# Patient Record
Sex: MALE | Race: OTHER
[De-identification: names, ages, dates, MRNs, and addresses within clinical notes are randomized per-mention and may not be internally consistent; named-entity substitution may affect disease eponyms.]

---

## 2019-04-24 ENCOUNTER — HOSPITAL ENCOUNTER (EMERGENCY)
Dept: HOSPITAL 62 - ER | Age: 34
Discharge: HOME | End: 2019-04-24
Payer: COMMERCIAL

## 2019-04-24 VITALS — DIASTOLIC BLOOD PRESSURE: 70 MMHG | SYSTOLIC BLOOD PRESSURE: 118 MMHG

## 2019-04-24 DIAGNOSIS — F17.200: ICD-10-CM

## 2019-04-24 DIAGNOSIS — M54.5: Primary | ICD-10-CM

## 2019-04-24 PROCEDURE — 99283 EMERGENCY DEPT VISIT LOW MDM: CPT

## 2019-04-24 PROCEDURE — 96372 THER/PROPH/DIAG INJ SC/IM: CPT

## 2019-04-24 PROCEDURE — 72110 X-RAY EXAM L-2 SPINE 4/>VWS: CPT

## 2019-04-24 NOTE — ER DOCUMENT REPORT
HPI





- HPI


Time Seen by Provider: 04/24/19 08:40


Pain Level: 5


Notes: 





33-year-old male presents to the ED for complaints of lower back pain the last 2

days, states pain was gradual, cannot remember significant episode that 

perpetuated his back pain, patient does work for the post service and states 

that his pain does become progressively worse when he works.  Patient reports he

did have herniated disc L4-L5 when he was in [facility] in 2015, he 

did have an MRI at that time, did follow-up with chiropractor, was never seen by

orthopedic specialist.  Denies fevers, chills,  chest pain,palpitations,  

shortness of breath, dyspnea, nausea, vomiting, diarrhea, abdominal pain, 

hematuria,blurred vision, double vision, loss of vision, speech changes, LH, 

dizziness, syncope, headaches, neck pain, weakness, bowel or bladder 

dysfunction, saddle anesthesia, numbness or tingling in bilateral upper or lower

extremities equally, muscle paralysis, weakness in bilateral upper or lower 

extremities equally or rash.





Past Medical History





- General


Information source: Patient





- Social History


Smoking Status: Current Every Day Smoker


Family History: Reviewed & Not Pertinent


Patient has suicidal ideation: No


Patient has homicidal ideation: No





- Past Medical History


Cardiac Medical History: 


   Denies: Hx Coronary Artery Disease, Hx Heart Attack, Hx Hypertension


Pulmonary Medical History: 


   Denies: Hx Asthma, Hx Bronchitis, Hx COPD, Hx Pneumonia


Neurological Medical History: Denies: Hx Cerebrovascular Accident, Hx Seizures


Renal/ Medical History: Denies: Hx Peritoneal Dialysis


Musculoskeletal Medical History: Denies Hx Arthritis


Past Surgical History: Reports: Hx Orthopedic Surgery





- Immunizations


Hx Diphtheria, Pertussis, Tetanus Vaccination: No





Vertical Provider Document





- CONSTITUTIONAL


Agree With Documented VS: Yes


Notes: 





REVIEW OF SYSTEMS:


CONSTITUTIONAL :  Denies fever,  chills, or sweats.  Denies recent illness.


EENT:   Denies eye, ear, throat, or mouth pain or symptoms.  Denies nasal or 

sinus congestion or discharge.  Denies throat, tongue, or mouth swelling or 

difficulty swallowing.


CARDIOVASCULAR:  Denies chest pain.  Denies palpitations or racing or irregular 

heart beat.  Denies ankle edema.


RESPIRATORY:  Denies cough, cold, or chest congestion.  Denies shortness of 

breath, difficulty breathing, or wheezing.


GASTROINTESTINAL:  Denies abdominal pain or distention.  Denies nausea, 

vomiting, or diarrhea.  Denies blood in vomitus, stools, or per rectum.  Denies 

black, tarry stools.  Denies constipation.  


GENITOURINARY:  Denies difficulty urinating, painful urination, burning, 

frequency, blood in urine, or discharge.


MUSCULOSKELETAL:  Denies back or neck pain or stiffness.  Denies joint pain or 

swelling.


SKIN:   Denies rash, lesions or sores.


HEMATOLOGIC :   Denies easy bruising or bleeding.


LYMPHATIC:  Denies swollen, enlarged glands.


NEUROLOGICAL:  Denies confusion or altered mental status.  Denies passing out or

loss of consciousness.  Denies dizziness or lightheadedness.  Denies headache.  

Denies weakness or paralysis or loss of use of either side.  Denies problems 

with gait or speech.  Denies sensory loss, numbness, or tingling.  Denies seiz

ures.


PSYCHIATRIC:  Denies anxiety or stress.  Denies depression, suicidal ideation, 

or homicidal ideation.





ALL OTHER SYSTEMS REVIEWED AND NEGATIVE.





Dictation was performed using Dragon voice recognition software 





PHYSICAL EXAMINATION:





GENERAL: Well-appearing, well-nourished and in no acute distress.





HEAD: Atraumatic, normocephalic.





EYES: Pupils equal round and reactive to light, extraocular movements intact, 

sclera anicteric, conjunctiva are normal.





ENT: Nares patent, oropharynx clear without exudates.  Moist mucous membranes.





NECK: Normal range of motion, supple without lymphadenopathy





LUNGS: Breath sounds clear to auscultation bilaterally and equal.  No wheezes 

rales or rhonchi.





HEART: Regular rate and rhythm without murmurs





ABDOMEN: Soft, nontender, nondistended abdomen.  No guarding, no rebound.  No 

masses appreciated.





Musculoskeletal: Normal range of motion, no pitting or edema.  No cyanosis Pain 

with flexion and extension at 30 degrees, positive straight leg test*** Normal 

hip rotation. DTR +2 in BLE equally.  Strength 5 out of 5 both distally and 

proximally to bilateral lower extremities normal motor and sensory function in 

BLE equally. Distal pulses + 2 BLE equally. Noted paraspinal tenderness near L2 

and L$. Noted spinal tenderness L2-L4. No CVA tenderness bilaterally. Femoral 

pulses + 2 bilaterally and equally. No abrasions, scars, lacerations, ecchymosis

of any recent trauma. normal gait. 











NEUROLOGICAL: Cranial nerves grossly intact.  Normal speech, normal gait.  

Normal sensory, motor exams 





PSYCH: Normal mood, normal affect.





SKIN: Warm, Dry, normal turgor, no rashes or lesions noted.





- INFECTION CONTROL


TRAVEL OUTSIDE OF THE [place] IN LAST 30 DAYS: No





Course





- Re-evaluation


Re-evalutation: 





04/24/19 10:33


33-year-old male presents to the ED with complaints of having back pain today, 

states he just noticed it, no trauma.  For L5 herniated disc back in 2015, had 

seen chiropractor which did help, this was evaluated at [facility] and 

[facility].  Patient works for post [employer] and states that sometimes 

his back does act of after carrying heavy packages.  X-ray lumbar spine shows 

degenerative joint disease, no acute fracture dislocation, discussed with 

patient that he does need to follow-up with orthopedic specialist outpatient and

primary care provider, patient was given Toradol 60 mg IM with some relief.  

Vitals have remained stable afebrile in no distress.  Patient has full strength 

in bilateral lower extremities equally, full motor and sensory function of BLE. 

We will send patient home on muscle relaxers, to take Norco when having severe 

pain, do not drive, drink or operate heavy machinery while taking medication as 

this can cause cognitive impairment or sedation.  Advised to follow-up with 

orthopedic specialist 24 to 48 hours.  After performing a Medical Screening 

Examination, I estimate there is LOW risk for EXPANDING OR RUPTURED ABDOMINAL 

AORTIC ANEURYSM, CAUDA EQUINA SYNDROME, EPIDURAL MASS ABSCESS OR LESION(S), 

OSTEOMYELITIS,PERSONAL HISTORY OF CANCER, IMMUNOSUPPERSSSION, HISTORY OF IV DRUG

USE, FRACTURE, CORD COMPERSSION, CANCER, RETROPERITONEAL BLEED, SPINAL EPIDURAL 

HEMATOMA, or HERNIATED DISK CAUSING SEVERE SPINAL STENOSIS, thus I consider the 

discharge disposition reasonable.  I have reevaluated this patient multiple 

times and no significant life threatening changes are noted. The patient  and I 

have discussed the diagnosis and risks, and we agree with discharging home and 

close follow-up. We also discussed returning to the Emergency Department 

immediately if new or worsening symptoms occur with the understanding that 

symptoms and presentations can change. We have discussed the symptoms which are 

most concerning (e.g., saddle anesthesia, urinary or bowel incontinence or 

retention, changing or worsening pain) that necessitate immediate return.











- Vital Signs


Vital signs: 


                                        











Temp Pulse Resp BP Pulse Ox


 


 97.8 F   84   18   129/75 H  99 


 


 04/24/19 07:56  04/24/19 07:56  04/24/19 07:56  04/24/19 07:56  04/24/19 07:56














Discharge





- Discharge


Clinical Impression: 


 Lumbar back pain





Condition: Stable


Disposition: HOME, SELF-CARE


Instructions:  Low Back Pain ([facility]), Muscle Strain ([facility]), Oral Narcotic 

Medication ([facility]), Pain Medication Injection ([facility]), Warm Packs ([facility])


Additional Instructions: 


Muscle Relaxers





     Muscle relaxing medications are usually prescribed for acute muscle spasm 

or injury to the neck and back.  They are often combined with antiinflammatory 

pain medication for increased relief.


     You may stop the muscle relaxer when the pain and stiffness have improved. 

Start the medication again if spasms recur.  


     Muscle relaxers may cause drowsiness, especially with the first dose.  Do 

not operate machinery or drive while under the effects of the medication.  Most 

muscle relaxers last up to 24 hours.  Do not combine the medication with 

alcohol.





Muscle Strain





     You have strained a muscle -- torn the fibers within the muscle. This often

occurs with strenuous exertion, or during an injury that suddenly stretches the 

muscle.  The seriousness of a strain varies. Some strains heal within days, 

others cause problems for months.


     X-rays cannot show a muscle strain.  X-rays are taken only if symptoms 

suggest that a fracture could be present.


     The usual treatment of a muscle strain is rest and ice packs. Sometimes, a 

sling, splint, or crutches may be necessary to rest the muscle.  The muscle can 

be used again once pain subsides.  Severe strains require a special exercise and

stretching program to prevent permanent stiffness and disability.  Your doctor 

will advise you if this will be necessary.


     Call the doctor immediately if pain or swelling becomes severe, or if 

numbness or discoloration develop.





Toradol Injection





     You have been given an injection of ketorolac tromethamine (Toradol).  This

is an excellent, safe drug for pain control.  It also has potent 

antiinflammatory action.  You should have significant pain relief within about 

one hour.


     Toradol is not addicting and is non-sedating.  It does not interfere with 

driving or work.


     Call or return if you develop itching, hives, shortness of breath, or rash.








Back Injury with Fracture





     You have a fracture of a vertebra in your back. The fracture does not 

appear to require an operation, and there's no evidence of danger to your spinal

cord. You should be able to recover at home. The transverse process sticks out 

to the side of the vertebra.  Muscles attach to it, allowing you to tip your 

back from side to side.  The fracture is usually caused by a blow to the back.


     Although painful, the fracture is not serious. The risk of complications is

very low. You can expect to recover fully within a few weeks.


     The treatment of this fracture is essentially the same as for a severe back

strain.  Muscle relaxers or antiinflammatory medication may be prescribed.  You 

should rest in bed for a few days until the pain eases. Any position that is 

comfortable is acceptable. Sometimes it helps to put a pillow under your knees 

if you're lying flat, or between the knees if you're lying on your side.


     Ice pack the painful area at first. After you are active again, you can 

apply gentle heat intermittently to relax sore muscles. You can continue with 

ice packs if you find them helpful in reducing muscle pain.


     As you improve, begin light activity. A recheck will determine when you are

ready to resume work or sports. Call the doctor or return at once if you develop

radiating pains, muscle weakness, blood in the urine, problems with the bladder 

or bowels, or numbness.





Take muscle relaxers and anti-inflammatories as directed, take oral narcotic as 

needed for severe pain, do not drive, drink or operate heavy machinery while 

taking medication as it can cause sedation and impairment of cognitive function.

 Follow-up with orthopedic specialist within 1 week, follow-up with primary care

provider within the next 24 to 48 hours, apply heat 20 minutes on 20 minutes off

several times a day, advised to do back stretches, you may need further imaging 

with your primary orthopedic specialist.  Return immediately for any new or 

worsening symptoms.





Follow up with primary care provider, call tomorrow to make followup 

appointment.


Prescriptions: 


RX: Meloxicam [Mobic] 15 mg PO DAILY #15 tablet


Methocarbamol [Robaxin 500 mg Tablet] 500 mg PO QID PRN #30 tablet


 PRN Reason: 


Forms:  Return to Work


Referrals: 


OWEN MERINO MD [ACTIVE STAFF] - Follow up as needed


FEDERICO JEFFERS MD [ACTIVE STAFF] - Follow up in 3-5 days

## 2019-04-24 NOTE — RADIOLOGY REPORT (SQ)
EXAM DESCRIPTION:  L SPINE WHOLE



COMPLETED DATE/TIME:  4/24/2019 9:51 am



REASON FOR STUDY:  lumbar pain, spinal tenderness, no trauma



COMPARISON:  None.



NUMBER OF VIEWS:  Five views including obliques.



TECHNIQUE:  AP, lateral, oblique, and sacral radiographic images acquired of the lumbar spine.



LIMITATIONS:  None.



FINDINGS:  MINERALIZATION: Normal.

SEGMENTATION: Normal.  No transitional anatomy.

ALIGNMENT: Normal.

VERTEBRAE: Maintained height.  No fracture or worrisome bone lesion.

DISCS: Mild disc space narrowing L5-S1.

POSTERIOR ELEMENTS: Bilateral pars defects L5-S1.

HARDWARE: None in the spine.

PARASPINAL SOFT TISSUES: Normal.

PELVIS: Intact as visualized. No fractures or worrisome bone lesions. SI joints intact.

OTHER: No other significant finding.



IMPRESSION:  Bilateral pars defects L5-S1.  Study otherwise unremarkable.  Mild disc space narrowing 
L5-S1.



TECHNICAL DOCUMENTATION:  JOB ID:  [id number]

 2011 [employer]- All Rights Reserved



Reading location - IP/workstation name: [email or web address]

## 2020-11-05 ENCOUNTER — HOSPITAL ENCOUNTER (EMERGENCY)
Dept: HOSPITAL 62 - ER | Age: 35
Discharge: LEFT BEFORE DISCHARGE | End: 2020-11-05
Payer: COMMERCIAL

## 2020-11-05 VITALS — SYSTOLIC BLOOD PRESSURE: 149 MMHG | DIASTOLIC BLOOD PRESSURE: 88 MMHG

## 2020-11-05 DIAGNOSIS — R06.02: ICD-10-CM

## 2020-11-05 DIAGNOSIS — R07.9: ICD-10-CM

## 2020-11-05 DIAGNOSIS — R10.10: Primary | ICD-10-CM

## 2020-11-05 DIAGNOSIS — R11.2: ICD-10-CM

## 2020-11-05 LAB
ADD MANUAL DIFF: NO
ALBUMIN SERPL-MCNC: 3.4 G/DL (ref 3.5–5)
ALP SERPL-CCNC: 43 U/L (ref 38–126)
ANION GAP SERPL CALC-SCNC: 8 MMOL/L (ref 5–19)
APPEARANCE UR: CLEAR
APTT PPP: YELLOW S
AST SERPL-CCNC: 19 U/L (ref 17–59)
BASOPHILS # BLD AUTO: 0.1 10^3/UL (ref 0–0.2)
BASOPHILS NFR BLD AUTO: 0.5 % (ref 0–2)
BILIRUB DIRECT SERPL-MCNC: 0 MG/DL (ref 0–0.4)
BILIRUB SERPL-MCNC: 0.4 MG/DL (ref 0.2–1.3)
BILIRUB UR QL STRIP: NEGATIVE
BUN SERPL-MCNC: 8 MG/DL (ref 7–20)
CALCIUM: 8 MG/DL (ref 8.4–10.2)
CHLORIDE SERPL-SCNC: 113 MMOL/L (ref 98–107)
CK MB SERPL-MCNC: 0.25 NG/ML (ref ?–4.55)
CO2 SERPL-SCNC: 20 MMOL/L (ref 22–30)
EOSINOPHIL # BLD AUTO: 0.1 10^3/UL (ref 0–0.6)
EOSINOPHIL NFR BLD AUTO: 0.7 % (ref 0–6)
ERYTHROCYTE [DISTWIDTH] IN BLOOD BY AUTOMATED COUNT: 13.6 % (ref 11.5–14)
GLUCOSE SERPL-MCNC: 94 MG/DL (ref 75–110)
GLUCOSE UR STRIP-MCNC: NEGATIVE MG/DL
HCT VFR BLD CALC: 46.2 % (ref 37.9–51)
HGB BLD-MCNC: 16 G/DL (ref 13.5–17)
KETONES UR STRIP-MCNC: (no result) MG/DL
LYMPHOCYTES # BLD AUTO: 1 10^3/UL (ref 0.5–4.7)
LYMPHOCYTES NFR BLD AUTO: 10.1 % (ref 13–45)
MCH RBC QN AUTO: 32.7 PG (ref 27–33.4)
MCHC RBC AUTO-ENTMCNC: 34.7 G/DL (ref 32–36)
MCV RBC AUTO: 94 FL (ref 80–97)
MONOCYTES # BLD AUTO: 0.5 10^3/UL (ref 0.1–1.4)
MONOCYTES NFR BLD AUTO: 4.8 % (ref 3–13)
NEUTROPHILS # BLD AUTO: 8.2 10^3/UL (ref 1.7–8.2)
NEUTS SEG NFR BLD AUTO: 83.9 % (ref 42–78)
NITRITE UR QL STRIP: NEGATIVE
PH UR STRIP: 8 [PH] (ref 5–9)
PLATELET # BLD: 276 10^3/UL (ref 150–450)
POTASSIUM SERPL-SCNC: 3.5 MMOL/L (ref 3.6–5)
PROT SERPL-MCNC: 6.2 G/DL (ref 6.3–8.2)
PROT UR STRIP-MCNC: NEGATIVE MG/DL
RBC # BLD AUTO: 4.9 10^6/UL (ref 4.35–5.55)
SP GR UR STRIP: 1.02
TOTAL CELLS COUNTED % (AUTO): 100 %
TROPONIN I SERPL-MCNC: < 0.012 NG/ML
UROBILINOGEN UR-MCNC: NEGATIVE MG/DL (ref ?–2)
WBC # BLD AUTO: 9.8 10^3/UL (ref 4–10.5)

## 2020-11-05 PROCEDURE — 71045 X-RAY EXAM CHEST 1 VIEW: CPT

## 2020-11-05 PROCEDURE — 93005 ELECTROCARDIOGRAM TRACING: CPT

## 2020-11-05 PROCEDURE — 99285 EMERGENCY DEPT VISIT HI MDM: CPT

## 2020-11-05 PROCEDURE — 81001 URINALYSIS AUTO W/SCOPE: CPT

## 2020-11-05 PROCEDURE — 82553 CREATINE MB FRACTION: CPT

## 2020-11-05 PROCEDURE — 80053 COMPREHEN METABOLIC PANEL: CPT

## 2020-11-05 PROCEDURE — 96360 HYDRATION IV INFUSION INIT: CPT

## 2020-11-05 PROCEDURE — 76705 ECHO EXAM OF ABDOMEN: CPT

## 2020-11-05 PROCEDURE — 93010 ELECTROCARDIOGRAM REPORT: CPT

## 2020-11-05 PROCEDURE — 84484 ASSAY OF TROPONIN QUANT: CPT

## 2020-11-05 PROCEDURE — 36415 COLL VENOUS BLD VENIPUNCTURE: CPT

## 2020-11-05 PROCEDURE — 85025 COMPLETE CBC W/AUTO DIFF WBC: CPT

## 2020-11-05 NOTE — RADIOLOGY REPORT (SQ)
EXAM DESCRIPTION:  U/S ABDOMEN LIMITED W/O DOP



IMAGES COMPLETED DATE/TIME:  11/5/2020 3:18 pm



REASON FOR STUDY:  RUQ pain, N/V



COMPARISON:  7/10/2015



TECHNIQUE:  Dynamic and static grayscale images acquired of the abdomen and recorded on PACS. Kaino
marilee selected color Doppler and spectral images recorded.



LIMITATIONS:  Body habitus



FINDINGS:  PANCREAS: Visualized portions the pancreas are normal in appearance.

LIVER: Left lobe of the liver is in Ack with assessed.  Right lobe is unremarkable.

LIVER VASCULATURE: Normal directional flow of the main portal vein and hepatic veins.

GALLBLADDER: No stones. Normal wall thickness. No pericholecystic fluid.

ULTRASOUND-DETECTED MEDLEY'S SIGN: Negative.

INTRAHEPATIC DUCTS AND COMMON DUCT: CBD and intrahepatic ducts normal caliber. No filling defects.

AORTA: No aneurysm.

RIGHT KIDNEY:  Limited visualization.

PERITONEAL AND RIGHT PLEURAL SPACE: No ascites or effusions.

OTHER: No other significant findings.



IMPRESSION:  Very limited study due to body habitus.  No acute findings.



TECHNICAL DOCUMENTATION:  JOB ID:  [id number]

 2011 [employer]- All Rights Reserved



Reading location - IP/workstation name: [email or web address]

## 2020-11-05 NOTE — ER DOCUMENT REPORT
ED General





- General


Chief Complaint: Abdominal Pain


Stated Complaint: UPPER ABDOMINAL PAIN


Time Seen by Provider: 11/05/20 11:17


TRAVEL OUTSIDE OF THE [place] IN LAST 30 DAYS: No





- HPI


Notes: 





34-year-old male presents to the emergency room today for complaints of right 

upper quadrant abdominal pain that started at 415 this morning as well as having

chest pain, shortness of breath with nausea and vomiting.  Patient states that 

the chest pain has been constant, states that it substernal, no radiation of 

pain.  Has not tried any over-the-counter medications.  Patient states that he 

did have a bowel movement this morning at 430, no melena and it was normal no 

diarrhea.  He did not feel any relief after that.  Patient states he was a 

former smoker, is not actively smoking.  Patient states he sparingly drinks 

alcohol denies any prior history of any heart issues, states he does have asthma

as a child.  Denies any rashes, fevers, chills.  Patient states the last time he

ate anything was at 5 PM yesterday and he ate chicken Andrew, denies having any

pain after he ate his dinner last night.  Denies fevers, chills, palpitations,  

dyspnea, diarrhea, hematuria,blurred vision, double vision, loss of vision, 

speech changes, LH, dizziness, syncope, headaches, wheezing, ST, URI, neck pain,

weakness, bowel or bladder dysfunction, saddle anesthesia, numbness or tingling 

in bilateral upper or lower extremities equally, muscle paralysis, weakness in 

bilateral upper or lower extremities equally or rash. Denies IV drug use.





- Related Data


Allergies/Adverse Reactions: 


                                        





No Known Allergies Allergy (Verified 11/05/20 09:45)


   











Past Medical History





- General


Information source: Patient





- Social History


Smoking Status: Former Smoker


Family History: Reviewed & Not Pertinent





- Past Medical History


Cardiac Medical History: 


   Denies: Hx Coronary Artery Disease, Hx Heart Attack, Hx Hypertension


Pulmonary Medical History: Reports: Hx Asthma - Hx of


   Denies: Hx Bronchitis, Hx COPD, Hx Pneumonia


Neurological Medical History: Denies: Hx Cerebrovascular Accident, Hx Seizures


Renal/ Medical History: Denies: Hx Peritoneal Dialysis


Musculoskeletal Medical History: Denies Hx Arthritis


Past Surgical History: Reports: Hx Orthopedic Surgery





- Immunizations


Hx Diphtheria, Pertussis, Tetanus Vaccination: No





Review of Systems





- Review of Systems


Constitutional: No symptoms reported


EENT: No symptoms reported


Cardiovascular: See HPI


Respiratory: No symptoms reported


Gastrointestinal: See HPI


Genitourinary: No symptoms reported


Male Genitourinary: No symptoms reported


Musculoskeletal: No symptoms reported


Skin: No symptoms reported


Hematologic/Lymphatic: No symptoms reported


Neurological/Psychological: No symptoms reported





Physical Exam





- Vital signs


Vitals: 


                                        











Temp Pulse Resp BP Pulse Ox


 


 97.1 F   72   20   149/88 H  100 


 


 11/05/20 08:57  11/05/20 08:57  11/05/20 08:57  11/05/20 08:57  11/05/20 08:57














- Notes


Notes: 








MEDICATIONS: I agree with the patient medications as charted by the RN.





ALLERGIES: I agree with the allergies as charted by the RN.





PAST MEDICAL HISTORY/PAST SURGICAL HISTORY: Reviewed and agree as charted by RN.





SOCIAL HISTORY: Reviewed and agree as charted by RN.





FAMILY HISTORY: No significant familial comorbid conditions directly related to 

patient complaint





EXAM:


Reviewed vital signs as charted by RN.





PHYSICAL EXAMINATION:reviewed vital signs by RN





GENERAL: Well-appearing, well-nourished and in no acute distress.





HEAD: Atraumatic, normocephalic.





EYES: Pupils equal round and reactive to light, extraocular movements intact, 

sclera anicteric, conjunctiva are normal.





ENT: Nares patent, oropharynx clear without exudates.  Moist mucous membranes.





NECK: Normal range of motion, supple without lymphadenopathy





LUNGS: Breath sounds clear to auscultation bilaterally and equal.  No wheezes 

rales or rhonchi.





HEART: Regular rate and rhythm without murmurs.  Unable to reproduce chest pain 

that brought patient to the emergency room a palpation





ABDOMEN: Soft, right upper quadrant, epigastric abdominal pain on palpation, 

nondistended abdomen.  No guarding, no rebound.  No masses appreciated.  No CVA 

tenderness appreciated bilaterally





Musculoskeletal: Normal range of motion, no pitting or edema.  No cyanosis.





NEUROLOGICAL: Cranial nerves grossly intact.  Normal speech, normal gait.  

Normal sensory, motor exams 





PSYCH: Normal mood, normal affect.





SKIN: Warm, Dry, normal turgor, no rashes or lesions noted.





Course





- Re-evaluation


Re-evalutation: 





11/05/20 15:44


Afebrile vital stable no distress.  Nurses notes reviewed.  CBC negative for 

leukocytosis or anemia.  CMP negative for hepatic or renal dysfunction, no 

electrolyte disturbances.  Lipase for some reason was canceled not by this 

person where the nurses taking care of the patient, unsure why lipase did not 

result.  Troponin less than 0.012.  Since patient's chest pain started at 0415 

this morning, he will need serial troponins.  Right upper quadrant ultrasound 

was inconclusive due to body habitus, he will require further imaging.1530-when 

I went into discussed with patient results and that he will need further imaging

as well as another troponin.  Patient became very upset and stated that he 

wanted to leave and he did not want to stay in the emergency room any longer.  

Patient stated that he is very irritated.  Stressed patient.troponins take time 

to leak into the bloodstream that he will need serial troponins as well as CT 

scan of abdomen pelvis with IV and oral contrast and that his lipase will need 

to be redrawn because it was canceled by somebody other than meand the nurses 

taking care of him.  Patient stated he did not want to stay.  After performing a

Medical Screening Examination, I spoke with the patient at length in regards to 

leaving the hospital against medical advice. I do not believe the patient should

leave but the patient is alert oriented x4, understands the risks and benefits 

of staying and leaving including disability and death. Pt understands that he 

can return at any time for further care and is more than welcome to do so. Pt 

verbalizes this understanding.


11/05/20 15:47








- Vital Signs


Vital signs: 


                                        











Temp Pulse Resp BP Pulse Ox


 


 97.1 F   72   20   149/88 H  100 


 


 11/05/20 08:57  11/05/20 08:57  11/05/20 08:57  11/05/20 08:57  11/05/20 08:57














- Laboratory


Result Diagrams: 


                                 11/05/20 09:43





                                 11/05/20 09:43


Laboratory results interpreted by me: 


                                        











  11/05/20 11/05/20 11/05/20





  09:43 09:43 10:07


 


Lymph % (Auto)  10.1 L  


 


Seg Neutrophils %  83.9 H  


 


Potassium   3.5 L 


 


Chloride   113 H 


 


Carbon Dioxide   20 L 


 


Calcium   8.0 L 


 


Total Protein   6.2 L 


 


Albumin   3.4 L 


 


Urine Ketones    TRACE H














- EKG Interpretation by Me


EKG shows normal: Sinus rhythm


Rate: Bradycardia


Additional EKG results interpreted by me: 





11/05/20 15:47


EKG heart rate 56, sinus bradycardia, P axis 1, QRS 26, T Axis 1, was 

interpreted by supervising ER physician.  No STEMI, no ST segment elevations





Discharge





- Discharge


Clinical Impression: 


 RUQ abdominal pain, Chest pain





Condition: Stable


Disposition: AGAINST MEDICAL ADVICE


Instructions:  Abdominal Pain ([facility])


Referrals: 


ALEJANDRO GARCIA MD [ACTIVE PROVISIONAL STAFF] - Follow up as needed


CORNELIUS OLMOS MD [ACTIVE STAFF] - Follow up as needed


DANA KATZ MD [ACTIVE STAFF] - Follow up as needed

## 2020-11-05 NOTE — RADIOLOGY REPORT (SQ)
EXAM DESCRIPTION:  CHEST SINGLE VIEW



IMAGES COMPLETED DATE/TIME:  11/5/2020 1:57 pm



REASON FOR STUDY:  chest pain/sob



COMPARISON:  None.



EXAM PARAMETERS:  NUMBER OF VIEWS: One view.

TECHNIQUE: Single frontal radiographic view of the chest acquired.

RADIATION DOSE: NA

LIMITATIONS: None.



FINDINGS:  LUNGS AND PLEURA: No opacities, masses or pneumothorax. No pleural effusion.

MEDIASTINUM AND HILAR STRUCTURES: No masses.  Contour normal.

HEART AND VASCULAR STRUCTURES: Heart normal in size.  Normal vasculature.

BONES: No acute findings.

HARDWARE: None in the chest.

OTHER: No other significant finding.



IMPRESSION:  NO ACUTE RADIOGRAPHIC FINDING IN THE CHEST.



TECHNICAL DOCUMENTATION:  JOB ID:  [id number]

 2011 [employer]- All Rights Reserved



Reading location - IP/workstation name: [email or web address]

## 2020-11-05 NOTE — EKG REPORT
SEVERITY:- OTHERWISE NORMAL ECG -

SINUS ARRHYTHMIA, RATE 45-68

:

Confirmed by: Elbert Perea MD 05-Nov-2020 18:08:42